# Patient Record
Sex: MALE | Race: WHITE | URBAN - METROPOLITAN AREA
[De-identification: names, ages, dates, MRNs, and addresses within clinical notes are randomized per-mention and may not be internally consistent; named-entity substitution may affect disease eponyms.]

---
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
## 2022-05-11 NOTE — H&P PST PEDIATRIC - NS MD HP ROS SLEEP AROUSAL
Is This A New Presentation, Or A Follow-Up?: Skin Lesion How Severe Is Your Skin Lesion?: mild Have Your Skin Lesions Been Treated?: not been treated Additional History: Pt started oral antibiotics Doxy 100mg bid but still very early in antibiotics Which Family Member (Optional)?: Unknown No